# Patient Record
Sex: FEMALE | Race: WHITE | NOT HISPANIC OR LATINO | URBAN - METROPOLITAN AREA
[De-identification: names, ages, dates, MRNs, and addresses within clinical notes are randomized per-mention and may not be internally consistent; named-entity substitution may affect disease eponyms.]

---

## 2019-08-16 PROBLEM — Z00.00 ENCOUNTER FOR PREVENTIVE HEALTH EXAMINATION: Status: ACTIVE | Noted: 2019-08-16

## 2021-06-24 ENCOUNTER — EMERGENCY (EMERGENCY)
Facility: HOSPITAL | Age: 56
LOS: 1 days | End: 2021-06-24
Admitting: EMERGENCY MEDICINE
Payer: COMMERCIAL

## 2021-06-24 PROCEDURE — 74177 CT ABD & PELVIS W/CONTRAST: CPT | Mod: 26

## 2021-06-24 PROCEDURE — 99285 EMERGENCY DEPT VISIT HI MDM: CPT

## 2021-10-05 ENCOUNTER — APPOINTMENT (OUTPATIENT)
Dept: DISASTER EMERGENCY | Facility: CLINIC | Age: 56
End: 2021-10-05

## 2021-10-05 DIAGNOSIS — Z01.818 ENCOUNTER FOR OTHER PREPROCEDURAL EXAMINATION: ICD-10-CM

## 2021-10-06 LAB — SARS-COV-2 N GENE NPH QL NAA+PROBE: NOT DETECTED

## 2022-07-05 ENCOUNTER — APPOINTMENT (OUTPATIENT)
Dept: ORTHOPEDIC SURGERY | Facility: CLINIC | Age: 57
End: 2022-07-05

## 2022-07-05 VITALS — WEIGHT: 135 LBS | HEIGHT: 62 IN | BODY MASS INDEX: 24.84 KG/M2

## 2022-07-05 DIAGNOSIS — M79.631 PAIN IN RIGHT FOREARM: ICD-10-CM

## 2022-07-05 PROCEDURE — 73090 X-RAY EXAM OF FOREARM: CPT | Mod: RT

## 2022-07-05 PROCEDURE — 99213 OFFICE O/P EST LOW 20 MIN: CPT

## 2022-07-05 NOTE — HISTORY OF PRESENT ILLNESS
[de-identified] : Patient presents for evaluation on RT forearm down to wrist. Patient states she had a fall in 2019. She has been doing PT and yoga. She mentions she has been typing at work and describes discomfort as a constant Rush's horse. Patient is taking Tylenol and meloxicam as needed. Patient is LHD.

## 2022-07-05 NOTE — DISCUSSION/SUMMARY
[de-identified] : I reviewed patient's radiographs and discussed her condition and treatment options.  I advised seeing Dr. Edmond to discuss treatment course for distal radius deformity.  Patient voiced understanding and agreement with the plan.\par

## 2022-07-05 NOTE — PHYSICAL EXAM
[FreeTextEntry8] : healed distal radius fracture with volar angulation [Right] : right hand [NL (75)] : Dorsiflexion 75 degrees [5___] : volarflexion 5[unfilled]/5 [] : good capillary refill in all fingers [TWNoteComboBox4] : volarflexion 30 degrees

## 2022-07-08 ENCOUNTER — APPOINTMENT (OUTPATIENT)
Dept: ORTHOPEDIC SURGERY | Facility: CLINIC | Age: 57
End: 2022-07-08

## 2022-07-08 VITALS — HEIGHT: 62 IN | BODY MASS INDEX: 25.4 KG/M2 | WEIGHT: 138 LBS

## 2022-07-08 DIAGNOSIS — Z78.9 OTHER SPECIFIED HEALTH STATUS: ICD-10-CM

## 2022-07-08 DIAGNOSIS — M51.26 OTHER INTERVERTEBRAL DISC DISPLACEMENT, LUMBAR REGION: ICD-10-CM

## 2022-07-08 DIAGNOSIS — M77.11 LATERAL EPICONDYLITIS, RIGHT ELBOW: ICD-10-CM

## 2022-07-08 PROCEDURE — 99214 OFFICE O/P EST MOD 30 MIN: CPT

## 2022-07-08 NOTE — IMAGING
[de-identified] : RIGHT ELBOW EXAM\par +ttp at lateral epicondyle.\par Skin intact\par No deformity, edema, or ecchymosis\par Hand with brisk capillary refill, warm and well perfused\par Non-tender\par Motor function intact to AIN, PIN, ulnar nerves\par Sensation intact median, ulnar, radial nerves\par \par Elbow ROM\par   Flexion 135°\par   Extension to 0°\par   Supination 85°\par   Pronation 85°\par \par No elbow instability noted\par Strength for elbow flexion & extension is 5/5\par Hand and wrist motion is intact and full\par Patient able to make a composite fist\par

## 2022-07-08 NOTE — HISTORY OF PRESENT ILLNESS
[de-identified] : 57F, RHD, PMHX of right wrist fracture 2019, cervical stenosis sx 2021 admits to recently having weakness and starting to drop objects. Admits to having an EMG done in 2020 - dx w/ mild carpal tunnel syndrome. Admits having kassandra horse type pain in the forearm, constantly. Admits to getting massages which does feel better but then pain starts as soon as she finishes.

## 2022-07-08 NOTE — ASSESSMENT
[FreeTextEntry1] : Right Lateral Epicondylitis\par The diagnosis of lateral epicondylitis and treatment options were discussed at length with the patient today.  I discussed that in terms of the natural history of the condition, it can sometimes take many months to improve.  I discussed treatment options including observation, activity modification including avoiding lifting with the hand pronated and instead lifting with the hand supinated, oral anti-inflammatories, hand therapy, counterforce brace, and steroid injection.  I discussed that for the steroid injection, the literature proves this to be no better than a placebo, however, it is still a potential option for the patient.  Furthermore, if this condition is recalcitrant, I discussed obtaining an MRI scan to assess both the lateral epicondyle as well as the radiocapitellar joint and specifically for a potential plica that could be impinging on the radiocapitellar joint. All of the patient's questions were answered to their satisfaction.\par \par F/u prn

## 2022-07-18 ENCOUNTER — APPOINTMENT (OUTPATIENT)
Dept: ORTHOPEDIC SURGERY | Facility: CLINIC | Age: 57
End: 2022-07-18

## 2022-09-12 ENCOUNTER — FORM ENCOUNTER (OUTPATIENT)
Age: 57
End: 2022-09-12

## 2022-10-30 ENCOUNTER — FORM ENCOUNTER (OUTPATIENT)
Age: 57
End: 2022-10-30

## 2023-06-28 ENCOUNTER — APPOINTMENT (OUTPATIENT)
Dept: CARDIOLOGY | Facility: CLINIC | Age: 58
End: 2023-06-28
Payer: COMMERCIAL

## 2023-06-28 ENCOUNTER — NON-APPOINTMENT (OUTPATIENT)
Age: 58
End: 2023-06-28

## 2023-06-28 VITALS
HEART RATE: 70 BPM | SYSTOLIC BLOOD PRESSURE: 106 MMHG | BODY MASS INDEX: 25.76 KG/M2 | HEIGHT: 62 IN | WEIGHT: 140 LBS | DIASTOLIC BLOOD PRESSURE: 66 MMHG | OXYGEN SATURATION: 98 %

## 2023-06-28 VITALS — SYSTOLIC BLOOD PRESSURE: 110 MMHG | DIASTOLIC BLOOD PRESSURE: 66 MMHG

## 2023-06-28 DIAGNOSIS — Z87.898 PERSONAL HISTORY OF OTHER SPECIFIED CONDITIONS: ICD-10-CM

## 2023-06-28 DIAGNOSIS — Z82.49 FAMILY HISTORY OF ISCHEMIC HEART DISEASE AND OTHER DISEASES OF THE CIRCULATORY SYSTEM: ICD-10-CM

## 2023-06-28 DIAGNOSIS — Z78.9 OTHER SPECIFIED HEALTH STATUS: ICD-10-CM

## 2023-06-28 DIAGNOSIS — Z86.79 PERSONAL HISTORY OF OTHER DISEASES OF THE CIRCULATORY SYSTEM: ICD-10-CM

## 2023-06-28 DIAGNOSIS — R53.83 OTHER FATIGUE: ICD-10-CM

## 2023-06-28 DIAGNOSIS — R53.83 OTHER MALAISE: ICD-10-CM

## 2023-06-28 DIAGNOSIS — Z00.00 ENCOUNTER FOR GENERAL ADULT MEDICAL EXAMINATION W/OUT ABNORMAL FINDINGS: ICD-10-CM

## 2023-06-28 DIAGNOSIS — R53.81 OTHER MALAISE: ICD-10-CM

## 2023-06-28 PROCEDURE — 93000 ELECTROCARDIOGRAM COMPLETE: CPT

## 2023-06-28 PROCEDURE — 99204 OFFICE O/P NEW MOD 45 MIN: CPT | Mod: 25

## 2023-06-28 NOTE — PHYSICAL EXAM
[Normal] : moves all extremities, no focal deficits, normal speech [de-identified] : No carotid bruits auscultated bilaterally

## 2023-06-28 NOTE — DISCUSSION/SUMMARY
[FreeTextEntry1] : I am recommending a CT of heart for calcium scoring.\par I am recommending a home sleep study to screen for FELIPE given her complaint of waking up feeling tired.\par \par Office will call with results.\par \par Follow up in 1 year. [EKG obtained to assist in diagnosis and management of assessed problem(s)] : EKG obtained to assist in diagnosis and management of assessed problem(s)

## 2023-06-28 NOTE — HISTORY OF PRESENT ILLNESS
[FreeTextEntry1] : 58 year old female with no significant PMHx, former smoker (smoked for 10 years and quit 30 years ago), presents for a cardiac evaluation. Patient has no chest pain, dyspnea or palpitations. Patient states she doesn't feel well rested when she gets up in the morning. Unsure if she snores or not. \par \par Father was a smoker and passed away from cardiovascular disease.\par \par There is no history of MI, CVA, CHF, or previous coronary intervention.\par

## 2023-07-25 ENCOUNTER — NON-APPOINTMENT (OUTPATIENT)
Age: 58
End: 2023-07-25

## 2023-08-14 ENCOUNTER — APPOINTMENT (OUTPATIENT)
Dept: ORTHOPEDIC SURGERY | Facility: CLINIC | Age: 58
End: 2023-08-14
Payer: COMMERCIAL

## 2023-08-14 VITALS — HEIGHT: 62 IN | BODY MASS INDEX: 25.76 KG/M2 | WEIGHT: 140 LBS

## 2023-08-14 PROCEDURE — 72100 X-RAY EXAM L-S SPINE 2/3 VWS: CPT

## 2023-08-14 PROCEDURE — 99215 OFFICE O/P EST HI 40 MIN: CPT

## 2023-08-14 NOTE — PHYSICAL EXAM
[TextEntry] : Constitutional: Well groomed and developed.  Respiratory: Normal, unlabored breathing. No use of accessory muscles.  Skin: No rashes or ulcers. Skin warm and dry.  Psychiatric: Oriented to time, place, person and event. No acute distress. Gait: Heel to toe Patient able to walk on toes and heels.    Lumbar spine:  Posture: Normal on coronal and sagittal alignment  AROM:  Flexion 60  Extension 10  Moderate pain with simulated truncal motion   Tenderness:  Thoracic: No tenderness on palpation  Lumbar: Moderate tenderness on palpation  Sacrum/coccyx: no tenderness on palpation  Greater trochanteric bursa:  No tenderness  PSIS: None    Motor:                         R             L LE:                                IS                    5             5 Quad              5             5 TA                  5             5 EHL                5             5 Gastroc         5             5                 R  L DTR: Patella  2+  2+ Achilles  2+  2+  Sensory: Light touch sensation decreased on left L5 distribution  Babinski's Sign: Negative bilaterally  Straight leg raise test: Negative bilaterally  HUGO test: negative bilaterally

## 2023-08-14 NOTE — HISTORY OF PRESENT ILLNESS
[Lower back] : lower back [Gradual] : gradual [7] : 7 [4] : 4 [Dull/Aching] : dull/aching [Shooting] : shooting [Throbbing] : throbbing [Intermittent] : intermittent [Walking/activity] : walking/activity [Sitting] : sitting [Part time] : Work status: part time [de-identified] : Patient presents today with lower back pain ongoing for years with NKI. Patient states she previously completed PT and had injections. Patient states her pain is mainly localized, sometimes radiates down her legs. Patient admits to previously taking Meloxicam and Tizanidine PRN for pain, ran out. [] : no [FreeTextEntry7] : down the legs [FreeTextEntry9] : stretching [de-identified] : Education

## 2023-08-14 NOTE — ASSESSMENT
[FreeTextEntry1] : Patient given prescription for MRI, follow up after study is completed to discuss results.   Patient will begin physical therapy.   Recommend: - NSAID - Heating pad - Muscle relaxer - Core strengthening exercise - Hamstring stretching exercise Patient is given back rehabilitation exercise book.

## 2023-08-15 ENCOUNTER — RESULT REVIEW (OUTPATIENT)
Age: 58
End: 2023-08-15

## 2023-09-07 ENCOUNTER — APPOINTMENT (OUTPATIENT)
Dept: ORTHOPEDIC SURGERY | Facility: CLINIC | Age: 58
End: 2023-09-07
Payer: COMMERCIAL

## 2023-09-07 PROCEDURE — 99215 OFFICE O/P EST HI 40 MIN: CPT

## 2023-09-07 NOTE — HISTORY OF PRESENT ILLNESS
[de-identified] : Patient presents today with lower back pain ongoing for years with NKI.  pt is going to PT 2x a week pt states it has been helping but sometimes it hurts more.  pt states pain radiates down to legs and toes, shooting pain on toes  Todays pain 2/10

## 2023-09-07 NOTE — ASSESSMENT
[FreeTextEntry1] : MRI 8/2023 HNP with DDD L5-S1  57 y/o patient presents today for MRI review of lumbar spine. Detailed above. Patient states she has had improvement in pain.   - Patient will continue PT.  - Referral to pain management for interlaminar lumbar GRISEL, follow up 2 weeks after injection.  - Educated patient on home exercises and stretching, also emphasized that she may do yoga   - Patient may take tizanidine PRN  Recommend: - NSAID - Heating pad - Muscle relaxer - Core strengthening exercise - Hamstring stretching exercise Patient is given back rehabilitation exercise book.   Follow up in 2 months

## 2023-09-07 NOTE — DATA REVIEWED
[MRI] : MRI [Lumbar Spine] : lumbar spine [Report was reviewed and noted in the chart] : The report was reviewed and noted in the chart [I reviewed the films/CD and agree] : I reviewed the films/CD and agree [FreeTextEntry1] : MRI 8/2023 HNP with DDD L5-S1

## 2023-10-02 ENCOUNTER — APPOINTMENT (OUTPATIENT)
Dept: ORTHOPEDIC SURGERY | Facility: CLINIC | Age: 58
End: 2023-10-02

## 2023-11-06 ENCOUNTER — APPOINTMENT (OUTPATIENT)
Dept: ORTHOPEDIC SURGERY | Facility: CLINIC | Age: 58
End: 2023-11-06
Payer: COMMERCIAL

## 2023-11-06 VITALS — BODY MASS INDEX: 25.76 KG/M2 | HEIGHT: 62 IN | WEIGHT: 140 LBS

## 2023-11-06 DIAGNOSIS — M51.36 OTHER INTERVERTEBRAL DISC DEGENERATION, LUMBAR REGION: ICD-10-CM

## 2023-11-06 DIAGNOSIS — M51.26 OTHER INTERVERTEBRAL DISC DISPLACEMENT, LUMBAR REGION: ICD-10-CM

## 2023-11-06 DIAGNOSIS — M51.37 OTHER INTERVERTEBRAL DISC DEGENERATION, LUMBOSACRAL REGION: ICD-10-CM

## 2023-11-06 DIAGNOSIS — M54.16 RADICULOPATHY, LUMBAR REGION: ICD-10-CM

## 2023-11-06 DIAGNOSIS — M48.061 SPINAL STENOSIS, LUMBAR REGION WITHOUT NEUROGENIC CLAUDICATION: ICD-10-CM

## 2023-11-06 PROCEDURE — 99213 OFFICE O/P EST LOW 20 MIN: CPT

## 2024-01-08 ENCOUNTER — APPOINTMENT (OUTPATIENT)
Dept: ORTHOPEDIC SURGERY | Facility: CLINIC | Age: 59
End: 2024-01-08

## 2024-06-21 ENCOUNTER — APPOINTMENT (OUTPATIENT)
Dept: ORTHOPEDIC SURGERY | Facility: CLINIC | Age: 59
End: 2024-06-21

## 2024-06-21 VITALS — BODY MASS INDEX: 25.76 KG/M2 | HEIGHT: 62 IN | WEIGHT: 140 LBS

## 2024-06-21 DIAGNOSIS — M75.52 BURSITIS OF LEFT SHOULDER: ICD-10-CM

## 2024-06-21 DIAGNOSIS — Z86.39 PERSONAL HISTORY OF OTHER ENDOCRINE, NUTRITIONAL AND METABOLIC DISEASE: ICD-10-CM

## 2024-06-21 PROCEDURE — 99213 OFFICE O/P EST LOW 20 MIN: CPT | Mod: 25

## 2024-06-21 PROCEDURE — 73030 X-RAY EXAM OF SHOULDER: CPT | Mod: LT

## 2024-06-21 NOTE — IMAGING
[de-identified] : LEFT SHOULDER EXAM +ttp at anterolateral shoulder, proximal biceps Skin intact No muscle atrophy noted Shoulder ROM   Forward flexion to 160; contralateral shoulder 160   Abduction to 160   ER with elbow at side to 45; contralateral shoulder 45   IR to L1  + Citlaly empty can test + Shaffer impingement test - Speed's test - Apprehension test - Lift-off test - Cross-body adduction test  Rotator cuff strength is 5/5 throughout Hand is warm and well perfused with brisk capillary refill throughout Motor function intact to axillary, AIN, PIN, and ulnar nerves Sensation intact axillary, median, ulnar, and superficial radial nerves Elbow and wrist motion intact and full Patient able to make a composite fist  Left shoulder radiographs with no fracture nor dislocation. Minimal GH arthrosis.

## 2024-06-21 NOTE — ASSESSMENT
[FreeTextEntry1] : Left Subacromial Impingement and proximal biceps tendonitis The diagnosis and treatment options were discussed at length with the patient.  The pathophysiology of shoulder impingement syndrome was discussed, including bursitis in the subacromial space causing rotator cuff inflammation and pain with overhead activities.  Treatment options discussed including observation, activity modifications, oral anti-inflammatories, physical therapy, steroid injection, and advanced imaging. All of the patient's questions were answered to their satisfaction.  Plan for follow-up in 6 weeks time, and should the symptoms gracia, the patient may cancel.  F/u 2-3mo

## 2024-06-21 NOTE — HISTORY OF PRESENT ILLNESS
[de-identified] : Age: 58 year old F PMHx: Hypothyroidism, cervical fusion in 2021 Hand Dominance: LHD Chief Complaint: LT Shoulder pain that began March 2024 Trauma: pt hyperextended the shoulder going to reach for a kid, she reports since the initial injury she has been playing pickleball which may have worsened the pain.  Outside Imaging/Treatment: none  OTC Medications: Advil  OT/PT: none  Bracing: none Pain worse with: over head movements  Pain better with: NSAIDS, massage  No Numbness/ Tingling

## 2024-06-26 ENCOUNTER — NON-APPOINTMENT (OUTPATIENT)
Age: 59
End: 2024-06-26

## 2024-06-26 ENCOUNTER — APPOINTMENT (OUTPATIENT)
Dept: CARDIOLOGY | Facility: CLINIC | Age: 59
End: 2024-06-26
Payer: COMMERCIAL

## 2024-06-26 VITALS
DIASTOLIC BLOOD PRESSURE: 76 MMHG | HEIGHT: 62 IN | BODY MASS INDEX: 24.84 KG/M2 | OXYGEN SATURATION: 98 % | SYSTOLIC BLOOD PRESSURE: 112 MMHG | WEIGHT: 135 LBS | HEART RATE: 94 BPM

## 2024-06-26 DIAGNOSIS — Z13.6 ENCOUNTER FOR SCREENING FOR CARDIOVASCULAR DISORDERS: ICD-10-CM

## 2024-06-26 PROCEDURE — 99213 OFFICE O/P EST LOW 20 MIN: CPT

## 2024-06-26 PROCEDURE — 93000 ELECTROCARDIOGRAM COMPLETE: CPT

## 2024-06-26 RX ORDER — LEVOTHYROXINE SODIUM 0.05 MG/1
50 TABLET ORAL
Qty: 90 | Refills: 0 | Status: ACTIVE | COMMUNITY
Start: 2024-06-03

## 2024-06-26 RX ORDER — LEVOTHYROXINE SODIUM 137 UG/1
TABLET ORAL
Refills: 0 | Status: DISCONTINUED | COMMUNITY
End: 2024-06-26

## 2024-06-26 RX ORDER — DIAZEPAM 5 MG/1
5 TABLET ORAL
Qty: 1 | Refills: 0 | Status: DISCONTINUED | COMMUNITY
Start: 2023-08-14 | End: 2024-06-26

## 2024-10-14 ENCOUNTER — APPOINTMENT (OUTPATIENT)
Dept: ORTHOPEDIC SURGERY | Facility: CLINIC | Age: 59
End: 2024-10-14
Payer: COMMERCIAL

## 2024-10-14 VITALS — BODY MASS INDEX: 24.84 KG/M2 | WEIGHT: 135 LBS | HEIGHT: 62 IN

## 2024-10-14 DIAGNOSIS — Z98.1 ARTHRODESIS STATUS: ICD-10-CM

## 2024-10-14 PROCEDURE — 99213 OFFICE O/P EST LOW 20 MIN: CPT

## 2024-10-14 PROCEDURE — 72040 X-RAY EXAM NECK SPINE 2-3 VW: CPT

## 2025-06-20 ENCOUNTER — APPOINTMENT (OUTPATIENT)
Dept: ORTHOPEDIC SURGERY | Facility: CLINIC | Age: 60
End: 2025-06-20

## 2025-06-20 VITALS — HEIGHT: 62 IN | BODY MASS INDEX: 24.84 KG/M2 | WEIGHT: 135 LBS

## 2025-06-20 PROBLEM — M25.552 ACUTE PAIN OF LEFT HIP: Status: ACTIVE | Noted: 2025-06-20

## 2025-06-20 PROBLEM — M25.552 ACUTE PAIN OF LEFT HIP: Status: RESOLVED | Noted: 2025-06-20 | Resolved: 2025-06-20

## 2025-06-20 PROCEDURE — 73503 X-RAY EXAM HIP UNI 4/> VIEWS: CPT | Mod: LT

## 2025-06-20 PROCEDURE — 99204 OFFICE O/P NEW MOD 45 MIN: CPT | Mod: 25

## 2025-06-20 PROCEDURE — 99214 OFFICE O/P EST MOD 30 MIN: CPT | Mod: 25

## 2025-06-20 RX ORDER — CYCLOBENZAPRINE 10 MG/1
10 TABLET ORAL
Qty: 30 | Refills: 1 | Status: ACTIVE | COMMUNITY
Start: 2025-06-20 | End: 1900-01-01

## 2025-07-01 ENCOUNTER — APPOINTMENT (OUTPATIENT)
Dept: CARDIOLOGY | Facility: CLINIC | Age: 60
End: 2025-07-01
Payer: COMMERCIAL

## 2025-07-01 VITALS
OXYGEN SATURATION: 98 % | HEIGHT: 62 IN | HEART RATE: 70 BPM | WEIGHT: 139 LBS | DIASTOLIC BLOOD PRESSURE: 60 MMHG | SYSTOLIC BLOOD PRESSURE: 112 MMHG | BODY MASS INDEX: 25.58 KG/M2

## 2025-07-01 PROBLEM — R53.81 MALAISE AND FATIGUE: Status: RESOLVED | Noted: 2023-06-28 | Resolved: 2025-07-01

## 2025-07-01 PROBLEM — R53.81 MALAISE AND FATIGUE: Status: ACTIVE | Noted: 2025-07-01

## 2025-07-01 PROCEDURE — 99214 OFFICE O/P EST MOD 30 MIN: CPT

## 2025-07-01 PROCEDURE — 93000 ELECTROCARDIOGRAM COMPLETE: CPT

## 2025-07-01 RX ORDER — ESTRADIOL 0.05 MG/D
0.05 PATCH TRANSDERMAL
Refills: 0 | Status: ACTIVE | COMMUNITY

## 2025-07-15 ENCOUNTER — OUTPATIENT (OUTPATIENT)
Dept: OUTPATIENT SERVICES | Facility: HOSPITAL | Age: 60
LOS: 1 days | End: 2025-07-15
Payer: COMMERCIAL

## 2025-07-15 DIAGNOSIS — G47.33 OBSTRUCTIVE SLEEP APNEA (ADULT) (PEDIATRIC): ICD-10-CM

## 2025-07-15 PROCEDURE — 95800 SLP STDY UNATTENDED: CPT

## 2025-07-15 PROCEDURE — 95800 SLP STDY UNATTENDED: CPT | Mod: 26
